# Patient Record
Sex: MALE | Race: WHITE | NOT HISPANIC OR LATINO
[De-identification: names, ages, dates, MRNs, and addresses within clinical notes are randomized per-mention and may not be internally consistent; named-entity substitution may affect disease eponyms.]

---

## 2022-12-01 PROBLEM — Z00.00 ENCOUNTER FOR PREVENTIVE HEALTH EXAMINATION: Status: ACTIVE | Noted: 2022-12-01

## 2022-12-02 ENCOUNTER — APPOINTMENT (OUTPATIENT)
Dept: COLORECTAL SURGERY | Facility: CLINIC | Age: 22
End: 2022-12-02

## 2022-12-02 ENCOUNTER — NON-APPOINTMENT (OUTPATIENT)
Age: 22
End: 2022-12-02

## 2022-12-02 VITALS
DIASTOLIC BLOOD PRESSURE: 74 MMHG | WEIGHT: 141 LBS | SYSTOLIC BLOOD PRESSURE: 125 MMHG | HEIGHT: 69 IN | BODY MASS INDEX: 20.88 KG/M2 | HEART RATE: 61 BPM | TEMPERATURE: 98.2 F

## 2022-12-02 DIAGNOSIS — L29.0 PRURITUS ANI: ICD-10-CM

## 2022-12-02 PROCEDURE — 99202 OFFICE O/P NEW SF 15 MIN: CPT

## 2022-12-02 RX ORDER — FLUOCINOLONE ACETONIDE 0.25 MG/G
0.03 OINTMENT TOPICAL 3 TIMES DAILY
Qty: 1 | Refills: 3 | Status: ACTIVE | COMMUNITY
Start: 2022-12-02 | End: 1900-01-01

## 2022-12-02 NOTE — HISTORY OF PRESENT ILLNESS
[FreeTextEntry1] : 23 yo M presents for evaluation of rectal pain and itching\par Denies PMH or PSH\par Never had colonoscopy\par Denies FMH colorectal CA or IBD\par \par c/o of anorectal cut like sensation pain and itching that has been constant for the last few years\par Denies triggering event or change in bowel habits or anorectal trauma\par \par Seen by [facility] ~ 1.5 years ago, diagnosed w/ fissure and advised would eventually heal, advised fiber supplementation, treated w/ nystatin/triamcinolone for one month which helped in the moment, but would return the next day\par Admits to wiping several times after BM w/ TP\par Denies rectal bleeding\par Moves bowel daily, no issues. Will feel burning cut like sensation if area gets wet in the shower\par Has not been taking fiber supplement for the last year as he didn't notice improvement\par Reports adequate dietary fiber and water intake. Rare coffee intake (once per week)\par \par \par Denies ASA/NSAIDs in last 7 days\par

## 2022-12-02 NOTE — PHYSICAL EXAM
[Excoriation] : excoriations [Skin Tags] : there were no residual hemorrhoidal skin tags seen [Normal] : was normal [None] : there was no rectal mass  [de-identified] : Extensive perianal erythema with superficial fissuring of the anal margin extending along the intergluteal cleft.

## 2022-12-02 NOTE — ASSESSMENT
[FreeTextEntry1] : Advised the patient of the etiology and treatment of pruritus ani.  Recommendations including appropriate perianal hygiene changes, avoidance of soaps and astringents, lubricating lotions and avoidance of excessive wiping detailed.\par \par Advised increasing fiber regimen.\par \par A trial of topical steroid cream was recommended.\par \par Advised return in 3-4 weeks if symptoms persist for perianal biopsy of the skin.\par

## 2022-12-05 RX ORDER — FLUOCINOLONE ACETONIDE 0.25 MG/G
0.03 OINTMENT TOPICAL 3 TIMES DAILY
Qty: 1 | Refills: 2 | Status: ACTIVE | COMMUNITY
Start: 2022-12-05 | End: 1900-01-01